# Patient Record
Sex: MALE | Employment: OTHER | URBAN - METROPOLITAN AREA
[De-identification: names, ages, dates, MRNs, and addresses within clinical notes are randomized per-mention and may not be internally consistent; named-entity substitution may affect disease eponyms.]

---

## 2021-10-25 ENCOUNTER — OFFICE VISIT (OUTPATIENT)
Dept: URGENT CARE | Facility: CLINIC | Age: 76
End: 2021-10-25
Payer: MEDICARE

## 2021-10-25 VITALS
SYSTOLIC BLOOD PRESSURE: 152 MMHG | HEIGHT: 69 IN | WEIGHT: 168 LBS | TEMPERATURE: 98 F | HEART RATE: 54 BPM | RESPIRATION RATE: 14 BRPM | OXYGEN SATURATION: 96 % | DIASTOLIC BLOOD PRESSURE: 91 MMHG | BODY MASS INDEX: 24.88 KG/M2

## 2021-10-25 DIAGNOSIS — J01.90 ACUTE NON-RECURRENT SINUSITIS, UNSPECIFIED LOCATION: Primary | ICD-10-CM

## 2021-10-25 PROBLEM — I10 HYPERTENSION: Status: ACTIVE | Noted: 2021-10-25

## 2021-10-25 PROBLEM — E78.5 HYPERLIPIDEMIA: Status: ACTIVE | Noted: 2021-10-25

## 2021-10-25 PROCEDURE — 3077F PR MOST RECENT SYSTOLIC BLOOD PRESSURE >= 140 MM HG: ICD-10-PCS | Mod: CPTII,S$GLB,, | Performed by: STUDENT IN AN ORGANIZED HEALTH CARE EDUCATION/TRAINING PROGRAM

## 2021-10-25 PROCEDURE — 1160F RVW MEDS BY RX/DR IN RCRD: CPT | Mod: CPTII,S$GLB,, | Performed by: STUDENT IN AN ORGANIZED HEALTH CARE EDUCATION/TRAINING PROGRAM

## 2021-10-25 PROCEDURE — 1159F PR MEDICATION LIST DOCUMENTED IN MEDICAL RECORD: ICD-10-PCS | Mod: CPTII,S$GLB,, | Performed by: STUDENT IN AN ORGANIZED HEALTH CARE EDUCATION/TRAINING PROGRAM

## 2021-10-25 PROCEDURE — 99203 OFFICE O/P NEW LOW 30 MIN: CPT | Mod: S$GLB,,, | Performed by: STUDENT IN AN ORGANIZED HEALTH CARE EDUCATION/TRAINING PROGRAM

## 2021-10-25 PROCEDURE — 3077F SYST BP >= 140 MM HG: CPT | Mod: CPTII,S$GLB,, | Performed by: STUDENT IN AN ORGANIZED HEALTH CARE EDUCATION/TRAINING PROGRAM

## 2021-10-25 PROCEDURE — 3080F PR MOST RECENT DIASTOLIC BLOOD PRESSURE >= 90 MM HG: ICD-10-PCS | Mod: CPTII,S$GLB,, | Performed by: STUDENT IN AN ORGANIZED HEALTH CARE EDUCATION/TRAINING PROGRAM

## 2021-10-25 PROCEDURE — 1160F PR REVIEW ALL MEDS BY PRESCRIBER/CLIN PHARMACIST DOCUMENTED: ICD-10-PCS | Mod: CPTII,S$GLB,, | Performed by: STUDENT IN AN ORGANIZED HEALTH CARE EDUCATION/TRAINING PROGRAM

## 2021-10-25 PROCEDURE — 1159F MED LIST DOCD IN RCRD: CPT | Mod: CPTII,S$GLB,, | Performed by: STUDENT IN AN ORGANIZED HEALTH CARE EDUCATION/TRAINING PROGRAM

## 2021-10-25 PROCEDURE — 3080F DIAST BP >= 90 MM HG: CPT | Mod: CPTII,S$GLB,, | Performed by: STUDENT IN AN ORGANIZED HEALTH CARE EDUCATION/TRAINING PROGRAM

## 2021-10-25 PROCEDURE — 99203 PR OFFICE/OUTPT VISIT, NEW, LEVL III, 30-44 MIN: ICD-10-PCS | Mod: S$GLB,,, | Performed by: STUDENT IN AN ORGANIZED HEALTH CARE EDUCATION/TRAINING PROGRAM

## 2021-10-25 RX ORDER — PREDNISONE 5 MG/1
5 TABLET ORAL 2 TIMES DAILY
Qty: 6 TABLET | Refills: 0 | Status: SHIPPED | OUTPATIENT
Start: 2021-10-25 | End: 2021-10-28

## 2021-10-25 RX ORDER — ROSUVASTATIN CALCIUM 20 MG/1
20 TABLET, COATED ORAL DAILY
COMMUNITY

## 2021-10-25 RX ORDER — CEFUROXIME AXETIL 250 MG/1
250 TABLET ORAL EVERY 12 HOURS
Qty: 20 TABLET | Refills: 0 | Status: SHIPPED | OUTPATIENT
Start: 2021-10-25 | End: 2021-11-04

## 2024-02-07 ENCOUNTER — OFFICE VISIT (OUTPATIENT)
Dept: INTERNAL MEDICINE | Facility: CLINIC | Age: 79
End: 2024-02-07
Payer: MEDICARE

## 2024-02-07 ENCOUNTER — PATIENT OUTREACH (OUTPATIENT)
Dept: ADMINISTRATIVE | Facility: HOSPITAL | Age: 79
End: 2024-02-07
Payer: MEDICARE

## 2024-02-07 VITALS
WEIGHT: 173.31 LBS | OXYGEN SATURATION: 98 % | SYSTOLIC BLOOD PRESSURE: 128 MMHG | HEART RATE: 66 BPM | DIASTOLIC BLOOD PRESSURE: 78 MMHG | BODY MASS INDEX: 25.67 KG/M2 | HEIGHT: 69 IN

## 2024-02-07 DIAGNOSIS — G47.00 INSOMNIA, UNSPECIFIED TYPE: ICD-10-CM

## 2024-02-07 DIAGNOSIS — M19.042 ARTHRITIS OF FINGER OF BOTH HANDS: ICD-10-CM

## 2024-02-07 DIAGNOSIS — Z00.00 ROUTINE PHYSICAL EXAMINATION: Primary | ICD-10-CM

## 2024-02-07 DIAGNOSIS — E78.5 HYPERLIPIDEMIA, UNSPECIFIED HYPERLIPIDEMIA TYPE: ICD-10-CM

## 2024-02-07 DIAGNOSIS — R42 LIGHTHEADEDNESS: ICD-10-CM

## 2024-02-07 DIAGNOSIS — I10 HYPERTENSION, UNSPECIFIED TYPE: ICD-10-CM

## 2024-02-07 DIAGNOSIS — M19.041 ARTHRITIS OF FINGER OF BOTH HANDS: ICD-10-CM

## 2024-02-07 LAB
OHS QRS DURATION: 108 MS
OHS QTC CALCULATION: 431 MS

## 2024-02-07 PROCEDURE — 1126F AMNT PAIN NOTED NONE PRSNT: CPT | Mod: HCNC,CPTII,S$GLB, | Performed by: INTERNAL MEDICINE

## 2024-02-07 PROCEDURE — 99999 PR PBB SHADOW E&M-EST. PATIENT-LVL III: CPT | Mod: PBBFAC,HCNC,, | Performed by: INTERNAL MEDICINE

## 2024-02-07 PROCEDURE — 99387 INIT PM E/M NEW PAT 65+ YRS: CPT | Mod: HCNC,S$GLB,, | Performed by: INTERNAL MEDICINE

## 2024-02-07 PROCEDURE — 1101F PT FALLS ASSESS-DOCD LE1/YR: CPT | Mod: HCNC,CPTII,S$GLB, | Performed by: INTERNAL MEDICINE

## 2024-02-07 PROCEDURE — 93005 ELECTROCARDIOGRAM TRACING: CPT | Mod: HCNC,S$GLB,, | Performed by: INTERNAL MEDICINE

## 2024-02-07 PROCEDURE — 3074F SYST BP LT 130 MM HG: CPT | Mod: HCNC,CPTII,S$GLB, | Performed by: INTERNAL MEDICINE

## 2024-02-07 PROCEDURE — 93010 ELECTROCARDIOGRAM REPORT: CPT | Mod: HCNC,S$GLB,, | Performed by: INTERNAL MEDICINE

## 2024-02-07 PROCEDURE — 1159F MED LIST DOCD IN RCRD: CPT | Mod: HCNC,CPTII,S$GLB, | Performed by: INTERNAL MEDICINE

## 2024-02-07 PROCEDURE — 3288F FALL RISK ASSESSMENT DOCD: CPT | Mod: HCNC,CPTII,S$GLB, | Performed by: INTERNAL MEDICINE

## 2024-02-07 PROCEDURE — 3078F DIAST BP <80 MM HG: CPT | Mod: HCNC,CPTII,S$GLB, | Performed by: INTERNAL MEDICINE

## 2024-02-07 RX ORDER — SILDENAFIL 100 MG/1
100 TABLET, FILM COATED ORAL DAILY PRN
COMMUNITY
Start: 2023-12-08 | End: 2024-02-07

## 2024-02-07 RX ORDER — DIAZEPAM 5 MG/1
2.5-5 TABLET ORAL
COMMUNITY
Start: 2023-11-30

## 2024-02-07 RX ORDER — TRAZODONE HYDROCHLORIDE 150 MG/1
150 TABLET ORAL NIGHTLY
COMMUNITY

## 2024-02-07 RX ORDER — DIPHENHYDRAMINE HCL 25 MG
25 CAPSULE ORAL NIGHTLY PRN
COMMUNITY

## 2024-02-07 RX ORDER — SILDENAFIL 100 MG/1
100 TABLET, FILM COATED ORAL DAILY PRN
Start: 2024-02-07

## 2024-02-07 RX ORDER — NAPROXEN 500 MG/1
500 TABLET ORAL
COMMUNITY
Start: 2023-12-26

## 2024-02-07 RX ORDER — IRBESARTAN 75 MG/1
37.5 TABLET ORAL NIGHTLY
COMMUNITY

## 2024-02-07 RX ORDER — ACETAMINOPHEN, DIPHENHYDRAMINE HCL, PHENYLEPHRINE HCL 325; 25; 5 MG/1; MG/1; MG/1
10 TABLET ORAL
COMMUNITY

## 2024-02-07 RX ORDER — AMOXICILLIN 500 MG
1 CAPSULE ORAL DAILY
COMMUNITY

## 2024-02-07 NOTE — PROGRESS NOTES
Subjective:       Patient ID: Brandon Thompson is a 78 y.o. male.    Chief Complaint: Establish Care    Patient here for annual exam.  His prior PCP is no longer on.  We spent quite a bit of time updating his medical records.  I updated personal history, family history, and surgical history.  I updated his medication list and he brought in several sets of labs to review.  He is on medication for blood pressure and allergies and cholesterol.  He is on meds p.r.n. for sleeping issues.  Works out at the gym several days a week.  He does sit-ups, treadmill and other activities.  Said he sometimes gets lightheaded if he bends over to tie his shoe and then stands up quickly.  He notices this more when he is at the gym than other places.  No chest pain or palpitations.  We did an office EKG which showed bradycardia at 59 and PAC.  Otherwise waiting on Cardiology review.  No new medications in the last 2 or 3 months.  He usually does not hydrate before or during his activities, only after.  I will give him some suggestions to monitor and improved.    I reviewed the labs from December in great detail.  Except for a mildly elevated fasting glucose his A1c, PSA, chemistry and lipids were all stable.  A few years ago he had a elevated PSA that required biopsy.  He said that continues to be monitored and he will probably keep his outside urologist.    He also sees an ENT periodically  He had a colonoscopy 2-3 years ago and he was told he did not need another 1 based on age      Review of Systems   Constitutional:  Negative for chills, fatigue and fever.   HENT:  Negative for nosebleeds and trouble swallowing.    Eyes:  Negative for pain and visual disturbance.   Respiratory:  Negative for cough, shortness of breath and wheezing.    Cardiovascular:  Negative for chest pain and palpitations.   Gastrointestinal:  Negative for abdominal pain, constipation, diarrhea, nausea and vomiting.   Genitourinary:  Negative for difficulty  urinating and hematuria.   Musculoskeletal:  Positive for arthralgias. Negative for back pain and neck pain.   Integumentary:  Negative for rash.   Neurological:  Negative for dizziness and headaches.   Hematological:  Does not bruise/bleed easily.   Psychiatric/Behavioral:  Negative for dysphoric mood and sleep disturbance.            Past Medical History:   Diagnosis Date    Elevated PSA     HTN (hypertension)     Insomnia     Mixed hyperlipidemia      Past Surgical History:   Procedure Laterality Date    BIOPSY OF TONSILS      CATARACT EXTRACTION, BILATERAL      EXCISION OF OSTEOCHONDROMA OF KNEE      left carpal tunnel      PROSTATE BIOPSY      SINUS SURGERY      STRABISMUS SURGERY      thumb surgery        Patient Active Problem List   Diagnosis    Hypertension    Hyperlipidemia    Insomnia        Objective:      Physical Exam  Constitutional:       General: He is not in acute distress.     Appearance: He is well-developed.   HENT:      Head: Normocephalic and atraumatic.      Right Ear: Tympanic membrane, ear canal and external ear normal.      Left Ear: Tympanic membrane, ear canal and external ear normal.      Mouth/Throat:      Pharynx: No oropharyngeal exudate or posterior oropharyngeal erythema.   Eyes:      General: No scleral icterus.     Conjunctiva/sclera: Conjunctivae normal.      Pupils: Pupils are equal, round, and reactive to light.   Neck:      Thyroid: No thyromegaly.      Comments: No supraclavicular nodes palpated  Cardiovascular:      Rate and Rhythm: Normal rate and regular rhythm.      Pulses: Normal pulses.      Heart sounds: Normal heart sounds. No murmur heard.  Pulmonary:      Effort: Pulmonary effort is normal.      Breath sounds: Normal breath sounds. No wheezing.   Abdominal:      General: Bowel sounds are normal.      Palpations: Abdomen is soft. There is no mass.      Tenderness: There is no abdominal tenderness.   Musculoskeletal:         General: Deformity (Distal finger joints  "on several fingers) present. No tenderness.      Cervical back: Normal range of motion and neck supple.      Right lower leg: No edema.      Left lower leg: No edema.   Lymphadenopathy:      Cervical: No cervical adenopathy.   Skin:     Coloration: Skin is not jaundiced or pale.   Neurological:      General: No focal deficit present.      Mental Status: He is alert and oriented to person, place, and time.   Psychiatric:         Mood and Affect: Mood normal.         Behavior: Behavior normal.         Assessment:       Problem List Items Addressed This Visit          Cardiac/Vascular    Hypertension    Relevant Orders    EKG 12-lead    Hyperlipidemia       Other    Insomnia     Other Visit Diagnoses       Routine physical examination    -  Primary    Arthritis of finger of both hands        Lightheadedness        Relevant Orders    EKG 12-lead            Plan:         Brandon was seen today for establish care.    Diagnoses and all orders for this visit:    Routine physical examination    Insomnia, unspecified type    Hypertension, unspecified type  -     EKG 12-lead    Hyperlipidemia, unspecified hyperlipidemia type    Arthritis of finger of both hands    Lightheadedness  -     EKG 12-lead    Other orders  -     sildenafiL (VIAGRA) 100 MG tablet; Take 1 tablet (100 mg total) by mouth daily as needed for Erectile Dysfunction.    Last C-scope 2021.        Labs from Dec reviewed. Will place in the chart  Rxs reviewed, updated    Review final EKG reading.  Otherwise appears clinically stable.  Monitor and let me know if he experiences any symptoms during exercise and not just when bending over      Portions of this note may have been created with voice recognition software. Occasional "wrong-word" or "sound-a-like" substitutions may have occurred due to the inherent limitations of voice recognition software. Please, read the note carefully and recognize, using context, where substitutions have occurred.  "

## 2024-02-08 ENCOUNTER — PATIENT MESSAGE (OUTPATIENT)
Dept: INTERNAL MEDICINE | Facility: CLINIC | Age: 79
End: 2024-02-08
Payer: MEDICARE

## 2024-09-06 ENCOUNTER — TELEPHONE (OUTPATIENT)
Dept: INTERNAL MEDICINE | Facility: CLINIC | Age: 79
End: 2024-09-06
Payer: MEDICARE

## 2024-09-06 DIAGNOSIS — Z00.00 ROUTINE PHYSICAL EXAMINATION: Primary | ICD-10-CM

## 2024-09-06 DIAGNOSIS — I10 HYPERTENSION, UNSPECIFIED TYPE: ICD-10-CM

## 2024-09-06 DIAGNOSIS — E78.5 HYPERLIPIDEMIA, UNSPECIFIED HYPERLIPIDEMIA TYPE: ICD-10-CM

## 2024-09-06 DIAGNOSIS — R73.09 ELEVATED GLUCOSE: ICD-10-CM

## 2024-09-06 DIAGNOSIS — Z12.5 SCREENING FOR PROSTATE CANCER: ICD-10-CM

## 2024-09-06 NOTE — TELEPHONE ENCOUNTER
----- Message from Josh Hutchinson sent at 9/6/2024 12:51 PM CDT -----  Type:  Patient Returning Call    Who Called:pt  Who Left Message for Patient:  Does the patient know what this is regarding?:annual orders  Would the patient rather a call back or a response via [facility]? call  Best Call Back Number: [phone]  Additional Information: pt states he would like to see if provider can put in annual lab work orders in. Thank you

## 2024-09-13 ENCOUNTER — LAB VISIT (OUTPATIENT)
Dept: LAB | Facility: HOSPITAL | Age: 79
End: 2024-09-13
Attending: INTERNAL MEDICINE
Payer: MEDICARE

## 2024-09-13 DIAGNOSIS — E78.5 HYPERLIPIDEMIA, UNSPECIFIED HYPERLIPIDEMIA TYPE: ICD-10-CM

## 2024-09-13 DIAGNOSIS — I10 HYPERTENSION, UNSPECIFIED TYPE: ICD-10-CM

## 2024-09-13 DIAGNOSIS — Z12.5 SCREENING FOR PROSTATE CANCER: ICD-10-CM

## 2024-09-13 DIAGNOSIS — Z00.00 ROUTINE PHYSICAL EXAMINATION: ICD-10-CM

## 2024-09-13 DIAGNOSIS — R73.09 ELEVATED GLUCOSE: ICD-10-CM

## 2024-09-13 LAB
ALBUMIN SERPL BCP-MCNC: 3.7 G/DL (ref 3.5–5.2)
ALP SERPL-CCNC: 69 U/L (ref 55–135)
ALT SERPL W/O P-5'-P-CCNC: 17 U/L (ref 10–44)
ANION GAP SERPL CALC-SCNC: 8 MMOL/L (ref 8–16)
AST SERPL-CCNC: 24 U/L (ref 10–40)
BASOPHILS # BLD AUTO: 0.05 K/UL (ref 0–0.2)
BASOPHILS NFR BLD: 0.8 % (ref 0–1.9)
BILIRUB SERPL-MCNC: 0.4 MG/DL (ref 0.1–1)
BUN SERPL-MCNC: 25 MG/DL (ref 8–23)
CALCIUM SERPL-MCNC: 9.5 MG/DL (ref 8.7–10.5)
CHLORIDE SERPL-SCNC: 106 MMOL/L (ref 95–110)
CHOLEST SERPL-MCNC: 169 MG/DL (ref 120–199)
CHOLEST/HDLC SERPL: 2.4 {RATIO} (ref 2–5)
CO2 SERPL-SCNC: 26 MMOL/L (ref 23–29)
COMPLEXED PSA SERPL-MCNC: 4.3 NG/ML (ref 0–4)
CREAT SERPL-MCNC: 1.1 MG/DL (ref 0.5–1.4)
DIFFERENTIAL METHOD BLD: ABNORMAL
EOSINOPHIL # BLD AUTO: 0.2 K/UL (ref 0–0.5)
EOSINOPHIL NFR BLD: 3.3 % (ref 0–8)
ERYTHROCYTE [DISTWIDTH] IN BLOOD BY AUTOMATED COUNT: 13.6 % (ref 11.5–14.5)
EST. GFR  (NO RACE VARIABLE): >60 ML/MIN/1.73 M^2
ESTIMATED AVG GLUCOSE: 105 MG/DL (ref 68–131)
GLUCOSE SERPL-MCNC: 100 MG/DL (ref 70–110)
HBA1C MFR BLD: 5.3 % (ref 4–5.6)
HCT VFR BLD AUTO: 40.5 % (ref 40–54)
HDLC SERPL-MCNC: 71 MG/DL (ref 40–75)
HDLC SERPL: 42 % (ref 20–50)
HGB BLD-MCNC: 13.3 G/DL (ref 14–18)
IMM GRANULOCYTES # BLD AUTO: 0.01 K/UL (ref 0–0.04)
IMM GRANULOCYTES NFR BLD AUTO: 0.2 % (ref 0–0.5)
LDLC SERPL CALC-MCNC: 85.4 MG/DL (ref 63–159)
LYMPHOCYTES # BLD AUTO: 3 K/UL (ref 1–4.8)
LYMPHOCYTES NFR BLD: 47.6 % (ref 18–48)
MCH RBC QN AUTO: 30.4 PG (ref 27–31)
MCHC RBC AUTO-ENTMCNC: 32.8 G/DL (ref 32–36)
MCV RBC AUTO: 93 FL (ref 82–98)
MONOCYTES # BLD AUTO: 0.7 K/UL (ref 0.3–1)
MONOCYTES NFR BLD: 10.5 % (ref 4–15)
NEUTROPHILS # BLD AUTO: 2.4 K/UL (ref 1.8–7.7)
NEUTROPHILS NFR BLD: 37.6 % (ref 38–73)
NONHDLC SERPL-MCNC: 98 MG/DL
NRBC BLD-RTO: 0 /100 WBC
PLATELET # BLD AUTO: 332 K/UL (ref 150–450)
PMV BLD AUTO: 9.1 FL (ref 9.2–12.9)
POTASSIUM SERPL-SCNC: 4.6 MMOL/L (ref 3.5–5.1)
PROT SERPL-MCNC: 7.1 G/DL (ref 6–8.4)
RBC # BLD AUTO: 4.37 M/UL (ref 4.6–6.2)
SODIUM SERPL-SCNC: 140 MMOL/L (ref 136–145)
TRIGL SERPL-MCNC: 63 MG/DL (ref 30–150)
TSH SERPL DL<=0.005 MIU/L-ACNC: 2.82 UIU/ML (ref 0.4–4)
WBC # BLD AUTO: 6.39 K/UL (ref 3.9–12.7)

## 2024-09-13 PROCEDURE — 83036 HEMOGLOBIN GLYCOSYLATED A1C: CPT | Mod: HCNC | Performed by: INTERNAL MEDICINE

## 2024-09-13 PROCEDURE — 80061 LIPID PANEL: CPT | Mod: HCNC | Performed by: INTERNAL MEDICINE

## 2024-09-13 PROCEDURE — 84153 ASSAY OF PSA TOTAL: CPT | Mod: HCNC | Performed by: INTERNAL MEDICINE

## 2024-09-13 PROCEDURE — 84443 ASSAY THYROID STIM HORMONE: CPT | Mod: HCNC | Performed by: INTERNAL MEDICINE

## 2024-09-13 PROCEDURE — 36415 COLL VENOUS BLD VENIPUNCTURE: CPT | Mod: HCNC | Performed by: INTERNAL MEDICINE

## 2024-09-13 PROCEDURE — 80053 COMPREHEN METABOLIC PANEL: CPT | Mod: HCNC | Performed by: INTERNAL MEDICINE

## 2024-09-13 PROCEDURE — 85025 COMPLETE CBC W/AUTO DIFF WBC: CPT | Mod: HCNC | Performed by: INTERNAL MEDICINE

## 2024-09-18 ENCOUNTER — OFFICE VISIT (OUTPATIENT)
Dept: INTERNAL MEDICINE | Facility: CLINIC | Age: 79
End: 2024-09-18
Payer: MEDICARE

## 2024-09-18 VITALS
HEIGHT: 69 IN | OXYGEN SATURATION: 97 % | WEIGHT: 175.69 LBS | DIASTOLIC BLOOD PRESSURE: 76 MMHG | HEART RATE: 54 BPM | SYSTOLIC BLOOD PRESSURE: 124 MMHG | BODY MASS INDEX: 26.02 KG/M2

## 2024-09-18 DIAGNOSIS — Z00.00 ROUTINE PHYSICAL EXAMINATION: Primary | ICD-10-CM

## 2024-09-18 DIAGNOSIS — R42 POSTURAL LIGHTHEADEDNESS: ICD-10-CM

## 2024-09-18 DIAGNOSIS — I10 HYPERTENSION, UNSPECIFIED TYPE: ICD-10-CM

## 2024-09-18 DIAGNOSIS — G47.00 INSOMNIA, UNSPECIFIED TYPE: ICD-10-CM

## 2024-09-18 PROCEDURE — 1101F PT FALLS ASSESS-DOCD LE1/YR: CPT | Mod: HCNC,CPTII,S$GLB, | Performed by: INTERNAL MEDICINE

## 2024-09-18 PROCEDURE — 1160F RVW MEDS BY RX/DR IN RCRD: CPT | Mod: HCNC,CPTII,S$GLB, | Performed by: INTERNAL MEDICINE

## 2024-09-18 PROCEDURE — 3074F SYST BP LT 130 MM HG: CPT | Mod: HCNC,CPTII,S$GLB, | Performed by: INTERNAL MEDICINE

## 2024-09-18 PROCEDURE — 99397 PER PM REEVAL EST PAT 65+ YR: CPT | Mod: HCNC,S$GLB,, | Performed by: INTERNAL MEDICINE

## 2024-09-18 PROCEDURE — 3078F DIAST BP <80 MM HG: CPT | Mod: HCNC,CPTII,S$GLB, | Performed by: INTERNAL MEDICINE

## 2024-09-18 PROCEDURE — 99999 PR PBB SHADOW E&M-EST. PATIENT-LVL IV: CPT | Mod: PBBFAC,HCNC,, | Performed by: INTERNAL MEDICINE

## 2024-09-18 PROCEDURE — 1159F MED LIST DOCD IN RCRD: CPT | Mod: HCNC,CPTII,S$GLB, | Performed by: INTERNAL MEDICINE

## 2024-09-18 PROCEDURE — 3288F FALL RISK ASSESSMENT DOCD: CPT | Mod: HCNC,CPTII,S$GLB, | Performed by: INTERNAL MEDICINE

## 2024-09-18 PROCEDURE — 1126F AMNT PAIN NOTED NONE PRSNT: CPT | Mod: HCNC,CPTII,S$GLB, | Performed by: INTERNAL MEDICINE

## 2024-09-18 RX ORDER — DIAZEPAM 5 MG/1
2.5-5 TABLET ORAL
Qty: 30 TABLET | Refills: 0 | Status: SHIPPED | OUTPATIENT
Start: 2024-09-18

## 2024-09-18 RX ORDER — ROSUVASTATIN CALCIUM 20 MG/1
20 TABLET, COATED ORAL DAILY
Qty: 90 TABLET | Refills: 3 | Status: SHIPPED | OUTPATIENT
Start: 2024-09-18
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
## 2024-09-26 DIAGNOSIS — Z00.00 ENCOUNTER FOR MEDICARE ANNUAL WELLNESS EXAM: ICD-10-CM

## 2025-03-05 RX ORDER — TRAZODONE HYDROCHLORIDE 150 MG/1
150 TABLET ORAL NIGHTLY
Qty: 90 TABLET | Refills: 0 | Status: SHIPPED | OUTPATIENT
Start: 2025-03-05

## 2025-03-05 NOTE — TELEPHONE ENCOUNTER
Refill Routing Note   Medication(s) are not appropriate for processing by [facility] for the following reason(s):        No active prescription written by provider    [facility] action(s):  Defer               Appointments  past 12m or future 3m with PCP    Date Provider   Last Visit   9/18/2024 Binu Canales MD   Next Visit   Visit date not found Binu Canales MD   ED visits in past 90 days: 0        Note composed:3:28 PM 03/05/2025

## 2025-03-05 NOTE — TELEPHONE ENCOUNTER
----- Message from Julisa sent at 3/5/2025 11:47 AM CST -----  Contact: [phone]  Requesting an RX refill or new RX.Is this a refill or new RX:  Refill RX name and strength (traZODone (DESYREL) 150 MG tablet):  Is this a 30 day or 90 day RX: 90Pharmacy name and phone # [facility] - [place], [place] - [address] [address]Ktnkf: [phone] Fax: [phone]

## 2025-03-05 NOTE — TELEPHONE ENCOUNTER
No care due was identified.  [facility] Embedded Care Due Messages. Reference number: [id number].   3/05/2025 1:41:12 PM CST

## 2025-03-20 ENCOUNTER — TELEPHONE (OUTPATIENT)
Dept: INTERNAL MEDICINE | Facility: CLINIC | Age: 80
End: 2025-03-20
Payer: MEDICARE

## 2025-03-20 RX ORDER — OSELTAMIVIR PHOSPHATE 75 MG/1
75 CAPSULE ORAL EVERY 12 HOURS
COMMUNITY
Start: 2025-03-16

## 2025-03-20 NOTE — TELEPHONE ENCOUNTER
According to the chart he received Tamiflu which is the treatment for the flu, not TheraFlu which is an over-the-counter medication for symptom relief.  Can we double check that he did  the prescription Tamiflu intake it.  Other than that it is just symptomatic treatment with over-the-counter meds for cough and congestion or body aches.

## 2025-03-20 NOTE — TELEPHONE ENCOUNTER
Pt said that he went to the ER on Sunday after being with his daughter who was sick and running a fever. He was diagnosed with Flu A and was given theraflu medication. He is still congested and coughing. Has no fever but has night sweats. He was told to follow up with his pcp. No appt with you was available until next week Thursday so I scheduled him to see LAURA Ward on Monday 3/24. He is wondering if you can prescribe him something for relief in the meantime

## 2025-03-20 NOTE — TELEPHONE ENCOUNTER
----- Message from Hope sent at 3/20/2025 11:03 AM CDT -----  Regarding: Appointment  Contact: [phone]  Type:  Needs Medical AdviceWho Called: KennethSymptoms (please be specific): flu How long has patient had these symptoms:  since SaturdayWould the patient rather a call back or a response via [facility]? callBe Call Back Number: [phone] Information: Calling to schedule an appointment per ED follow up as soon as possible. Please call patient to schedule today.

## 2025-03-21 ENCOUNTER — TELEPHONE (OUTPATIENT)
Dept: INTERNAL MEDICINE | Facility: CLINIC | Age: 80
End: 2025-03-21
Payer: MEDICARE

## 2025-03-21 NOTE — TELEPHONE ENCOUNTER
----- Message from Mac sent at 3/21/2025 10:10 AM CDT -----  Contact: pt @ [phone]  Patient is returning a phone call. Who left a message for the patient: Araceli Noonan MA Does patient know what this is regarding:  Rx Would you like a call back, or a response through your [facility] portal?: call back Comments:

## 2025-03-21 NOTE — TELEPHONE ENCOUNTER
Spoke to patient and yes he is taking the Tamiflu, he will take his last dose today---patient is also taking over the counter medication---he does state that he is feeling better, has appt scheduled for Monday with Harmony and will cancel if he feels better and no need to be seen

## 2025-03-24 ENCOUNTER — OFFICE VISIT (OUTPATIENT)
Dept: INTERNAL MEDICINE | Facility: CLINIC | Age: 80
End: 2025-03-24
Payer: MEDICARE

## 2025-03-24 VITALS
WEIGHT: 173.75 LBS | BODY MASS INDEX: 25.73 KG/M2 | HEIGHT: 69 IN | HEART RATE: 70 BPM | DIASTOLIC BLOOD PRESSURE: 70 MMHG | OXYGEN SATURATION: 98 % | SYSTOLIC BLOOD PRESSURE: 126 MMHG

## 2025-03-24 DIAGNOSIS — J10.1 INFLUENZA A: Primary | ICD-10-CM

## 2025-03-24 DIAGNOSIS — L98.9 LESION OF SKIN OF SCALP: ICD-10-CM

## 2025-03-24 PROCEDURE — 1160F RVW MEDS BY RX/DR IN RCRD: CPT | Mod: HCNC,CPTII,S$GLB, | Performed by: PHYSICIAN ASSISTANT

## 2025-03-24 PROCEDURE — 3078F DIAST BP <80 MM HG: CPT | Mod: HCNC,CPTII,S$GLB, | Performed by: PHYSICIAN ASSISTANT

## 2025-03-24 PROCEDURE — 3288F FALL RISK ASSESSMENT DOCD: CPT | Mod: HCNC,CPTII,S$GLB, | Performed by: PHYSICIAN ASSISTANT

## 2025-03-24 PROCEDURE — 3074F SYST BP LT 130 MM HG: CPT | Mod: HCNC,CPTII,S$GLB, | Performed by: PHYSICIAN ASSISTANT

## 2025-03-24 PROCEDURE — 1159F MED LIST DOCD IN RCRD: CPT | Mod: HCNC,CPTII,S$GLB, | Performed by: PHYSICIAN ASSISTANT

## 2025-03-24 PROCEDURE — 99999 PR PBB SHADOW E&M-EST. PATIENT-LVL IV: CPT | Mod: PBBFAC,HCNC,, | Performed by: PHYSICIAN ASSISTANT

## 2025-03-24 PROCEDURE — 1101F PT FALLS ASSESS-DOCD LE1/YR: CPT | Mod: HCNC,CPTII,S$GLB, | Performed by: PHYSICIAN ASSISTANT

## 2025-03-24 PROCEDURE — 99214 OFFICE O/P EST MOD 30 MIN: CPT | Mod: HCNC,S$GLB,, | Performed by: PHYSICIAN ASSISTANT

## 2025-03-24 PROCEDURE — 1125F AMNT PAIN NOTED PAIN PRSNT: CPT | Mod: HCNC,CPTII,S$GLB, | Performed by: PHYSICIAN ASSISTANT

## 2025-03-24 RX ORDER — BENZONATATE 100 MG/1
100 CAPSULE ORAL 3 TIMES DAILY PRN
Qty: 20 CAPSULE | Refills: 0 | Status: SHIPPED | OUTPATIENT
Start: 2025-03-24 | End: 2025-04-03

## 2025-03-24 NOTE — PROGRESS NOTES
"Subjective     Patient ID: Brandon Thompson is a 79 y.o. male.    Chief Complaint: Follow-up (Influenza )    HPI    Established pt of Binu Canales MD (new to me)    Seen at ED on 3/16, tested positive for Flu. Rx tamiflu. CXR was clear    Today still fatigued  Coughing at night. Nasal congestion  Phlegm is better. Taking Mucinex and using nasal sprays.   No cp, sob or fevers. Dtr had flu as well.     He reports a skin lesion to scalp there for sometime, prev removed by his outside dermatologist but is now back. He has appt with his Derm next week.     Past Medical History:   Diagnosis Date    Elevated PSA     HTN (hypertension)     Insomnia     Mixed hyperlipidemia      Social History[1]    Review of patient's allergies indicates:   Allergen Reactions    Grass pollen-june grass standard Other (See Comments)     Nasal congestion         Review of Systems   Constitutional:  Positive for fatigue. Negative for chills, fever and unexpected weight change.   Respiratory:  Positive for cough. Negative for shortness of breath.    Cardiovascular:  Negative for chest pain and leg swelling.   Gastrointestinal:  Negative for abdominal pain, nausea and vomiting.   Integumentary:  Negative for rash.   Neurological:  Negative for weakness and headaches.          Objective  /70 (BP Location: Left arm, Patient Position: Sitting)   Pulse 70   Ht 5' 9" (1.753 m)   Wt 78.8 kg (173 lb 11.6 oz)   SpO2 98%   BMI 25.65 kg/m²       Physical Exam  Vitals reviewed.   Constitutional:       General: He is not in acute distress.     Appearance: He is well-developed. He is not ill-appearing.   HENT:      Head: Normocephalic and atraumatic.      Right Ear: Tympanic membrane, ear canal and external ear normal.      Left Ear: Tympanic membrane, ear canal and external ear normal.      Mouth/Throat:      Mouth: Mucous membranes are moist.      Pharynx: Oropharynx is clear.   Cardiovascular:      Rate and Rhythm: Normal rate and " regular rhythm.      Heart sounds: No murmur heard.  Pulmonary:      Effort: Pulmonary effort is normal.      Breath sounds: Normal breath sounds. No wheezing or rales.   Abdominal:      General: Bowel sounds are normal.      Palpations: Abdomen is soft.      Tenderness: There is no abdominal tenderness.   Musculoskeletal:      Right lower leg: No edema.      Left lower leg: No edema.   Skin:     General: Skin is warm and dry.      Findings: Lesion (approx 1cm rough raised crusted lesion to central scalp) present. No rash.   Neurological:      Mental Status: He is alert.   Psychiatric:         Mood and Affect: Mood normal.            Assessment and Plan     1. Influenza A  Outside ED note and CXR personally reviewed  Improving  Symptomatic care discussed  -     benzonatate (TESSALON) 100 MG capsule; Take 1 capsule (100 mg total) by mouth 3 (three) times daily as needed for Cough.  Dispense: 20 capsule; Refill: 0    2. Lesion of skin of scalp  Keep follow up with outside derm, Dr. Mony Cleary.     Harmony Ward PA-C       [1]   Social History  Tobacco Use    Smoking status: Never    Smokeless tobacco: Never   Substance Use Topics    Alcohol use: Yes

## 2025-04-01 ENCOUNTER — TELEPHONE (OUTPATIENT)
Dept: HOME HEALTH SERVICES | Facility: CLINIC | Age: 80
End: 2025-04-01
Payer: MEDICARE

## 2025-04-02 ENCOUNTER — TELEPHONE (OUTPATIENT)
Dept: HOME HEALTH SERVICES | Facility: CLINIC | Age: 80
End: 2025-04-02
Payer: MEDICARE

## 2025-04-03 ENCOUNTER — OFFICE VISIT (OUTPATIENT)
Dept: HOME HEALTH SERVICES | Facility: CLINIC | Age: 80
End: 2025-04-03
Payer: MEDICARE

## 2025-04-03 VITALS
BODY MASS INDEX: 24.29 KG/M2 | DIASTOLIC BLOOD PRESSURE: 70 MMHG | HEIGHT: 69 IN | RESPIRATION RATE: 16 BRPM | HEART RATE: 69 BPM | WEIGHT: 164 LBS | TEMPERATURE: 98 F | SYSTOLIC BLOOD PRESSURE: 128 MMHG | OXYGEN SATURATION: 95 %

## 2025-04-03 DIAGNOSIS — N13.8 BENIGN PROSTATIC HYPERPLASIA WITH URINARY OBSTRUCTION: ICD-10-CM

## 2025-04-03 DIAGNOSIS — E78.5 HYPERLIPIDEMIA, UNSPECIFIED HYPERLIPIDEMIA TYPE: ICD-10-CM

## 2025-04-03 DIAGNOSIS — H90.3 SENSORINEURAL HEARING LOSS, BILATERAL: ICD-10-CM

## 2025-04-03 DIAGNOSIS — Q66.50 CONGENITAL PES PLANUS, UNSPECIFIED LATERALITY: ICD-10-CM

## 2025-04-03 DIAGNOSIS — N40.1 BENIGN PROSTATIC HYPERPLASIA WITH URINARY OBSTRUCTION: ICD-10-CM

## 2025-04-03 DIAGNOSIS — G47.00 INSOMNIA, UNSPECIFIED TYPE: ICD-10-CM

## 2025-04-03 DIAGNOSIS — N52.9 ERECTILE DYSFUNCTION, UNSPECIFIED ERECTILE DYSFUNCTION TYPE: ICD-10-CM

## 2025-04-03 DIAGNOSIS — M19.90 OSTEOARTHRITIS, UNSPECIFIED OSTEOARTHRITIS TYPE, UNSPECIFIED SITE: ICD-10-CM

## 2025-04-03 DIAGNOSIS — Z00.00 ENCOUNTER FOR MEDICARE ANNUAL WELLNESS EXAM: Primary | ICD-10-CM

## 2025-04-03 DIAGNOSIS — I10 HYPERTENSION, UNSPECIFIED TYPE: ICD-10-CM

## 2025-04-03 PROBLEM — R97.20 HIGH PROSTATE SPECIFIC ANTIGEN (PSA): Status: ACTIVE | Noted: 2025-04-03

## 2025-04-03 PROBLEM — J30.9 ALLERGIC RHINITIS: Status: ACTIVE | Noted: 2025-04-03

## 2025-04-03 NOTE — PATIENT INSTRUCTIONS
Counseling and Referral of Other Preventative  (Italic type indicates deductible and co-insurance are waived)    Patient Name: Brandon Thompson  Today's Date: 4/3/2025    Health Maintenance       Date Due Completion Date    RSV Vaccine (Age 60+ and Pregnant patients) (1 - 1-dose 75+ series) Never done ---    TETANUS VACCINE 08/24/2029 8/24/2019    Lipid Panel 09/13/2029 9/13/2024        No orders of the defined types were placed in this encounter.      The following information is provided to all patients.  This information is to help you find resources for any of the problems found today that may be affecting your health:                  Living healthy guide: www.[facility].[place].gov      Understanding Diabetes: www.diabetes.org      Eating healthy: www.cdc.gov/healthyweight      CDC home safety checklist: www.cdc.gov/steadi/patient.html      Agency on Aging: www.goea.[place].[facility]      Alcoholics anonymous (AA): www.aa.org      Physical Activity: www.samra.nih.gov/ci4ascv      Tobacco use: www.quitwithusla.org

## 2025-04-03 NOTE — PROGRESS NOTES
"Brandon Thompson presented for an initial Medicare AWV today. The following components were reviewed and updated:    Medical history  Family History  Social history  Allergies and Current Medications  Health Risk Assessment  Health Maintenance  Care Team    **See Completed Assessments for Annual Wellness visit with in the encounter summary    The following assessments were completed:  Depression Screening  Cognitive function Screening    Timed Get Up Test  Whisper Test: Deferred, hearing impaired      Opioid documentation:      Patient does not have a current opioid prescription.          Vitals:    04/03/25 1049   BP: 128/70   BP Location: Left arm   Patient Position: Sitting   Pulse: 69   Resp: 16   Temp: 98.4 °F (36.9 °C)   SpO2: 95%   Weight: 74.4 kg (164 lb)   Height: 5' 9" (1.753 m)     Body mass index is 24.22 kg/m².       Physical Exam  Vitals reviewed.   Constitutional:       General: He is not in acute distress.     Appearance: Normal appearance.   HENT:      Head: Normocephalic.   Cardiovascular:      Rate and Rhythm: Normal rate and regular rhythm.      Pulses: Normal pulses.      Heart sounds: Normal heart sounds.   Pulmonary:      Effort: Pulmonary effort is normal. No respiratory distress.      Breath sounds: Normal breath sounds. No wheezing.   Abdominal:      General: Bowel sounds are normal.      Tenderness: There is no abdominal tenderness.   Musculoskeletal:         General: Normal range of motion.      Cervical back: Normal range of motion.      Right lower leg: No edema.      Left lower leg: No edema.   Skin:     General: Skin is warm and dry.      Capillary Refill: Capillary refill takes less than 2 seconds.      Findings: Bruising present.   Neurological:      General: No focal deficit present.      Mental Status: He is alert and oriented to person, place, and time.   Psychiatric:         Mood and Affect: Mood normal.         Behavior: Behavior normal.           Diagnoses and health risks " identified today and associated recommendations/orders:    1. Encounter for Medicare annual wellness exam  Assessments completed.   recommendations reviewed.  F/u with PCP as instructed.      2. Hypertension, unspecified type  Chronic, stable on current regimen. Stopped taking Avapro recently. Followed by PCP.     3. Hyperlipidemia, unspecified hyperlipidemia type  Chronic, stable on current statin regimen. Followed by PCP.     4. Benign prostatic hyperplasia with urinary obstruction  Chronic, stable on current regimen. [facility]   PSA, Screen (ng/mL)   Date Value   09/13/2024 4.3 (H)     5. Erectile dysfunction, unspecified erectile dysfunction type  Chronic, stable on current Viagra regimen. [facility].     6. Osteoarthritis, unspecified osteoarthritis type, unspecified site  Chronic, stable on current Naproxen regimen. [facility].     7. Congenital pes planus, unspecified laterality  Chronic, stable on current regimen. [facility].     8. Insomnia, unspecified type  Chronic, stable on current Trazodone regimen. [facility].     9. Sensorineural hearing loss, bilateral  Chronic, stable on current hearing aid regimen. [facility].       Provided Brandon with a 5-10 year written screening schedule and personal prevention plan. Recommendations were developed using the USPSTF age appropriate recommendations. Education, counseling, and referrals were provided as needed.  After Visit Summary printed and given to patient which includes a list of additional screenings\tests needed.    Follow up in about 1 year (around 4/3/2026) for Medicare AWV.      Marii Montiel NP    I offered to discuss advanced care planning, including how to pick a person who would make decisions for you if you were unable to make them for yourself, called a health care power of , and what kind of decisions you might make such as use of life sustaining treatments such as ventilators and tube feeding when faced  with a life limiting illness recorded on a living will that they will need to know. (How you want to be cared for as you near the end of your natural life)     X  Patient has advanced directive written but has opted not to place them on file with the institution.

## 2025-04-16 ENCOUNTER — OFFICE VISIT (OUTPATIENT)
Dept: INTERNAL MEDICINE | Facility: CLINIC | Age: 80
End: 2025-04-16
Payer: MEDICARE

## 2025-04-16 VITALS
TEMPERATURE: 98 F | WEIGHT: 173.06 LBS | BODY MASS INDEX: 25.63 KG/M2 | OXYGEN SATURATION: 97 % | DIASTOLIC BLOOD PRESSURE: 70 MMHG | HEART RATE: 65 BPM | SYSTOLIC BLOOD PRESSURE: 124 MMHG | HEIGHT: 69 IN

## 2025-04-16 DIAGNOSIS — J30.9 ALLERGIC RHINITIS, UNSPECIFIED SEASONALITY, UNSPECIFIED TRIGGER: ICD-10-CM

## 2025-04-16 DIAGNOSIS — J10.1 INFLUENZA A: ICD-10-CM

## 2025-04-16 DIAGNOSIS — R05.2 SUBACUTE COUGH: ICD-10-CM

## 2025-04-16 DIAGNOSIS — N40.1 BENIGN PROSTATIC HYPERPLASIA WITH URINARY OBSTRUCTION: ICD-10-CM

## 2025-04-16 DIAGNOSIS — I10 HYPERTENSION, UNSPECIFIED TYPE: ICD-10-CM

## 2025-04-16 DIAGNOSIS — N13.8 BENIGN PROSTATIC HYPERPLASIA WITH URINARY OBSTRUCTION: ICD-10-CM

## 2025-04-16 DIAGNOSIS — R01.1 CARDIAC MURMUR: ICD-10-CM

## 2025-04-16 DIAGNOSIS — J20.9 ACUTE BRONCHITIS, UNSPECIFIED ORGANISM: Primary | ICD-10-CM

## 2025-04-16 PROCEDURE — 99999 PR PBB SHADOW E&M-EST. PATIENT-LVL IV: CPT | Mod: PBBFAC,,, | Performed by: INTERNAL MEDICINE

## 2025-04-16 PROCEDURE — G2211 COMPLEX E/M VISIT ADD ON: HCPCS | Mod: S$GLB,,, | Performed by: INTERNAL MEDICINE

## 2025-04-16 PROCEDURE — 1159F MED LIST DOCD IN RCRD: CPT | Mod: CPTII,S$GLB,, | Performed by: INTERNAL MEDICINE

## 2025-04-16 PROCEDURE — 3074F SYST BP LT 130 MM HG: CPT | Mod: CPTII,S$GLB,, | Performed by: INTERNAL MEDICINE

## 2025-04-16 PROCEDURE — 1126F AMNT PAIN NOTED NONE PRSNT: CPT | Mod: CPTII,S$GLB,, | Performed by: INTERNAL MEDICINE

## 2025-04-16 PROCEDURE — 1101F PT FALLS ASSESS-DOCD LE1/YR: CPT | Mod: CPTII,S$GLB,, | Performed by: INTERNAL MEDICINE

## 2025-04-16 PROCEDURE — 3288F FALL RISK ASSESSMENT DOCD: CPT | Mod: CPTII,S$GLB,, | Performed by: INTERNAL MEDICINE

## 2025-04-16 PROCEDURE — 99213 OFFICE O/P EST LOW 20 MIN: CPT | Mod: S$GLB,,, | Performed by: INTERNAL MEDICINE

## 2025-04-16 PROCEDURE — 3078F DIAST BP <80 MM HG: CPT | Mod: CPTII,S$GLB,, | Performed by: INTERNAL MEDICINE

## 2025-04-16 RX ORDER — PREDNISONE 20 MG/1
20 TABLET ORAL 2 TIMES DAILY
Qty: 10 TABLET | Refills: 0 | Status: SHIPPED | OUTPATIENT
Start: 2025-04-16 | End: 2025-04-21

## 2025-04-16 RX ORDER — AZITHROMYCIN 250 MG/1
TABLET, FILM COATED ORAL
Qty: 6 TABLET | Refills: 0 | Status: SHIPPED | OUTPATIENT
Start: 2025-04-16 | End: 2025-04-21

## 2025-04-16 NOTE — PROGRESS NOTES
Follow-up  Associated symptoms include coughing and fatigue. Pertinent negatives include no fever.   Cough  Associated symptoms include wheezing (Subjective). Pertinent negatives include no fever.   History of Present Illness    CHIEF COMPLAINT:  Brandon presents with persistent cough, wheezing, and mucus production following a bout of influenza A approximately one month ago.    HPI:  Brandon contracted influenza A on March 18th, confirmed by a positive test in the emergency room. He initially had severe coughing episodes lasting up to 5 minutes and produced copious amounts of green sputum. Brandon was treated with Tamiflu in the ER and received a single dose of what he believes was decadron.    Brandon's symptoms persisted for 2 weeks post-treatment. Although they improved somewhat, they have not fully resolved after a month. His cough is now worsening again, and he woke up multiple times the previous night due to coughing. He still feels mucus in his chest, though he is not producing as much as before. Brandon has not regained his normal voice since the illness began.    Brandon developed severe indigestion and heartburn since the onset of his illness. He takes OTC medications like Zantac before meals, but still has heartburn. He sometimes takes Mylanta, which helps alleviate the symptoms.    Brandon has a history of allergy problems and used to receive biannual or annual corticosteroid injections from Dr. Johnson, sometimes accompanied by antibiotics if he was producing significant sputum. He suspects current symptoms might be partly due to allergies, mentioning significant pollen in his environment.    The illness has impacted patient's daily activities. He went to the gym recently after a month-long absence and had soreness in his legs. He also lost weight during his illness.    Brandon denies having had a fever at any point during this illness. Brandon denies having COVID-19.    MEDICATIONS:  Brandon started  Tamiflu on March 18th for Influenza A. He is taking Zantac or a similar antacid, as well as an antacid from [employer] before meals for heartburn. Brandon is also on Mylanta for severe heartburn, particularly before bed.     MEDICAL HISTORY:  Brandon has a history of long-standing allergies. He was diagnosed with Influenza A on March 18th. Brandon receives a flu vaccine annually.    FAMILY HISTORY:  Family history is significant for the patient's daughter with mild cerebral palsy.    TEST RESULTS:  Brandon tested positive for Influenza A on March 18th (Sunday).    IMAGING:  A chest XR was performed on March 18th (Sunday), which was clear and showed no signs of pneumonia.    SOCIAL HISTORY:  Occupation: Retired, former [employer] employee      ROS:  General: -fever, +waking at night coughing, +sleep disturbances  ENT: +hoarseness  Respiratory: +cough, +wheezing, +productive cough  Gastrointestinal: +heartburn  Allergic: +allergic reactions             Review of Systems   Constitutional:  Positive for fatigue. Negative for fever.   HENT:  Positive for sinus pressure/congestion.    Respiratory:  Positive for cough and wheezing (Subjective).        Past Medical History:   Diagnosis Date    Elevated PSA     HTN (hypertension)     Insomnia     Mixed hyperlipidemia      Past Surgical History:   Procedure Laterality Date    BIOPSY OF TONSILS      CATARACT EXTRACTION, BILATERAL      EXCISION OF OSTEOCHONDROMA OF KNEE      left carpal tunnel      PROSTATE BIOPSY      SINUS SURGERY      STRABISMUS SURGERY      thumb surgery        Problem List[1]       Objective:      Physical Exam    Cardiovascular: Heart murmur.  Lungs: No wheezing.       Physical Exam  Constitutional:       Appearance: Normal appearance.   HENT:      Head: Normocephalic and atraumatic.      Right Ear: Tympanic membrane, ear canal and external ear normal.      Left Ear: Tympanic membrane, ear canal and external ear normal.      Nose: No congestion.       Mouth/Throat:      Pharynx: No oropharyngeal exudate or posterior oropharyngeal erythema.   Cardiovascular:      Heart sounds: Murmur heard.   Pulmonary:      Effort: No respiratory distress.      Breath sounds: No stridor. No wheezing, rhonchi or rales.   Abdominal:      Tenderness: There is no abdominal tenderness.   Musculoskeletal:      Right lower leg: No edema.      Left lower leg: No edema.   Neurological:      Mental Status: He is alert.           Assessment:       Problem List Items Addressed This Visit          ENT    Allergic rhinitis       Cardiac/Vascular    Hypertension    Relevant Orders    Echo       Renal/    Benign prostatic hyperplasia with urinary obstruction     Other Visit Diagnoses         Acute bronchitis, unspecified organism    -  Primary    Relevant Medications    azithromycin (Z-HALEY) 250 MG tablet    predniSONE (DELTASONE) 20 MG tablet      Influenza A        says was improving but now has persistent mucus and cough      Subacute cough        mucus somewhat improved but not resolved    Relevant Medications    azithromycin (Z-HALEY) 250 MG tablet    predniSONE (DELTASONE) 20 MG tablet      Cardiac murmur        Relevant Orders    Echo            Plan:       Brandon was seen today for follow-up, cough and chest congestion.    Diagnoses and all orders for this visit:    Acute bronchitis, unspecified organism  -     azithromycin (Z-HALEY) 250 MG tablet; Take 2 tablets by mouth on day 1; Take 1 tablet by mouth on days 2-5  -     predniSONE (DELTASONE) 20 MG tablet; Take 1 tablet (20 mg total) by mouth 2 (two) times daily. for 5 days    Influenza A  Comments:  says was improving but now has persistent mucus and cough    Subacute cough  Comments:  mucus somewhat improved but not resolved  Orders:  -     azithromycin (Z-HALEY) 250 MG tablet; Take 2 tablets by mouth on day 1; Take 1 tablet by mouth on days 2-5  -     predniSONE (DELTASONE) 20 MG tablet; Take 1 tablet (20 mg total) by mouth 2 (two)  "times daily. for 5 days    Hypertension, unspecified type  -     Echo    Benign prostatic hyperplasia with urinary obstruction    Allergic rhinitis, unspecified seasonality, unspecified trigger    Cardiac murmur  -     Echo       We will treat as a possible secondary bacterial infection since he did seem to be getting better for a few weeks then worsened.    New murmur-we will check cardiac echo  Follow-up if not improving and echo.      As this patient's PCP, I am actively managing and/or treating their chronic medical conditions including HTN and have been for at least 1 year. This includes, but is not limited to, medication management, coordination of care, documentation review from their specialists and labs/imaging review where pertinent.     Portions of this note may have been created with [employer] voice recognition software. Occasional "wrong-word" or "sound-a-like" substitutions may have occurred due to the inherent limitations of voice recognition software. Please, read the note carefully and recognize, using context, where substitutions have occurred.         [1]   Patient Active Problem List  Diagnosis    Hypertension    Hyperlipidemia    Insomnia    Allergic rhinitis    Benign prostatic hyperplasia with urinary obstruction    High prostate specific antigen (PSA)    Male erectile dysfunction, unspecified    Osteoarthritis    Sensorineural hearing loss, bilateral    Congenital pes planus, unspecified foot     "

## 2025-04-30 ENCOUNTER — HOSPITAL ENCOUNTER (OUTPATIENT)
Dept: CARDIOLOGY | Facility: HOSPITAL | Age: 80
Discharge: HOME | End: 2025-04-30
Attending: INTERNAL MEDICINE
Payer: MEDICARE

## 2025-04-30 VITALS
WEIGHT: 173 LBS | BODY MASS INDEX: 25.62 KG/M2 | DIASTOLIC BLOOD PRESSURE: 84 MMHG | HEART RATE: 63 BPM | HEIGHT: 69 IN | SYSTOLIC BLOOD PRESSURE: 120 MMHG

## 2025-04-30 PROCEDURE — 93306 TTE W/DOPPLER COMPLETE: CPT | Mod: 26,HCNC,, | Performed by: STUDENT IN AN ORGANIZED HEALTH CARE EDUCATION/TRAINING PROGRAM

## 2025-04-30 PROCEDURE — 93306 TTE W/DOPPLER COMPLETE: CPT | Mod: HCNC

## 2025-05-07 ENCOUNTER — PATIENT MESSAGE (OUTPATIENT)
Dept: INTERNAL MEDICINE | Facility: CLINIC | Age: 80
End: 2025-05-07
Payer: MEDICARE

## 2025-06-01 RX ORDER — TRAZODONE HYDROCHLORIDE 150 MG/1
150 TABLET ORAL NIGHTLY
Qty: 90 TABLET | Refills: 0 | Status: SHIPPED | OUTPATIENT
Start: 2025-06-01

## 2025-07-30 RX ORDER — ROSUVASTATIN CALCIUM 20 MG/1
20 TABLET, COATED ORAL DAILY
Qty: 90 TABLET | Refills: 0 | Status: SHIPPED | OUTPATIENT
Start: 2025-07-30

## 2025-07-30 NOTE — TELEPHONE ENCOUNTER
Care Due:                  Date            Visit Type   Department     Provider  --------------------------------------------------------------------------------                                MYCHART                              FOLLOWUP/OF  [facility] INTERNAL  Last Visit: 04-      FICE VISIT   MEDICINE       Binu Canales  Next Visit: None Scheduled  None         None Found                                                            Last  Test          Frequency    Reason                     Performed    Due Date  --------------------------------------------------------------------------------    CMP.........  12 months..  rosuvastatin.............  09- 09-    Lipid Panel.  12 months..  rosuvastatin.............  09- 09-    [employer] Embedded Care Due Messages. Reference number: [id number].   7/30/2025 2:49:43 AM CDT

## 2025-07-30 NOTE — TELEPHONE ENCOUNTER
Provider Staff:  Action required for this patient    Requires labs CMP AND LIPID PANEL     Please see care gap opportunities below in Care Due Message.    Thanks!  [facility]     Appointments      Date Provider   Last Visit   4/16/2025 Binu Canales MD   Next Visit   Visit date not found Binu Canales MD     Refill Decision Note   Brandon Thompson  is requesting a refill authorization.  Brief Assessment and Rationale for Refill:  Approve     Medication Therapy Plan:         Comments:     Note composed:10:36 AM 07/30/2025

## 2025-08-29 RX ORDER — TRAZODONE HYDROCHLORIDE 150 MG/1
150 TABLET ORAL NIGHTLY
Qty: 90 TABLET | Refills: 2 | Status: SHIPPED | OUTPATIENT
Start: 2025-08-29